# Patient Record
Sex: FEMALE | Race: BLACK OR AFRICAN AMERICAN | NOT HISPANIC OR LATINO | Employment: OTHER | URBAN - METROPOLITAN AREA
[De-identification: names, ages, dates, MRNs, and addresses within clinical notes are randomized per-mention and may not be internally consistent; named-entity substitution may affect disease eponyms.]

---

## 2019-10-09 ENCOUNTER — OFFICE VISIT (OUTPATIENT)
Dept: URGENT CARE | Facility: URGENT CARE | Age: 59
End: 2019-10-09
Payer: COMMERCIAL

## 2019-10-09 ENCOUNTER — ANCILLARY PROCEDURE (OUTPATIENT)
Dept: GENERAL RADIOLOGY | Facility: CLINIC | Age: 59
End: 2019-10-09
Attending: NURSE PRACTITIONER
Payer: COMMERCIAL

## 2019-10-09 VITALS
DIASTOLIC BLOOD PRESSURE: 72 MMHG | SYSTOLIC BLOOD PRESSURE: 126 MMHG | RESPIRATION RATE: 14 BRPM | HEART RATE: 88 BPM | OXYGEN SATURATION: 97 % | BODY MASS INDEX: 29.19 KG/M2 | WEIGHT: 171 LBS | HEIGHT: 64 IN | TEMPERATURE: 98.5 F

## 2019-10-09 DIAGNOSIS — M25.511 ACUTE PAIN OF BOTH SHOULDERS: Primary | ICD-10-CM

## 2019-10-09 DIAGNOSIS — M25.512 ACUTE PAIN OF BOTH SHOULDERS: ICD-10-CM

## 2019-10-09 DIAGNOSIS — M25.512 ACUTE PAIN OF BOTH SHOULDERS: Primary | ICD-10-CM

## 2019-10-09 DIAGNOSIS — M25.511 ACUTE PAIN OF BOTH SHOULDERS: ICD-10-CM

## 2019-10-09 PROBLEM — L30.9 CHRONIC ECZEMA: Status: ACTIVE | Noted: 2017-01-24

## 2019-10-09 PROBLEM — E05.90 HYPERTHYROIDISM: Status: ACTIVE | Noted: 2017-05-01

## 2019-10-09 PROBLEM — G47.19 EXCESSIVE DAYTIME SLEEPINESS: Status: ACTIVE | Noted: 2017-08-15

## 2019-10-09 PROBLEM — M19.90 OSTEOARTHROSIS: Status: ACTIVE | Noted: 2017-01-24

## 2019-10-09 PROBLEM — J45.40 MODERATE PERSISTENT ASTHMA WITHOUT COMPLICATION: Status: ACTIVE | Noted: 2017-01-24

## 2019-10-09 PROBLEM — G47.30 SLEEP APNEA: Status: ACTIVE | Noted: 2017-08-15

## 2019-10-09 PROCEDURE — 99203 OFFICE O/P NEW LOW 30 MIN: CPT | Performed by: NURSE PRACTITIONER

## 2019-10-09 PROCEDURE — 73030 X-RAY EXAM OF SHOULDER: CPT | Mod: RT

## 2019-10-09 RX ORDER — TOLTERODINE TARTRATE 2 MG/1
TABLET, EXTENDED RELEASE ORAL
COMMUNITY
Start: 2019-08-27

## 2019-10-09 RX ORDER — OXYBUTYNIN CHLORIDE 5 MG/1
1 TABLET ORAL AT BEDTIME
COMMUNITY
Start: 2019-09-11

## 2019-10-09 RX ORDER — CYCLOBENZAPRINE HCL 10 MG
10 TABLET ORAL
COMMUNITY
Start: 2019-08-27

## 2019-10-09 RX ORDER — HYDROCORTISONE 2.5 %
CREAM (GRAM) TOPICAL
COMMUNITY
Start: 2019-08-27

## 2019-10-09 RX ORDER — METHYLPREDNISOLONE 4 MG
TABLET, DOSE PACK ORAL
Qty: 21 TABLET | Refills: 0 | Status: SHIPPED | OUTPATIENT
Start: 2019-10-09

## 2019-10-09 RX ORDER — FLUTICASONE PROPIONATE 50 MCG
SPRAY, SUSPENSION (ML) NASAL
COMMUNITY
Start: 2019-08-27

## 2019-10-09 RX ORDER — CETIRIZINE HYDROCHLORIDE 10 MG/1
10 TABLET ORAL
COMMUNITY
Start: 2019-08-27

## 2019-10-09 RX ORDER — ALBUTEROL SULFATE 90 UG/1
2 AEROSOL, METERED RESPIRATORY (INHALATION)
COMMUNITY
Start: 2019-04-12

## 2019-10-09 RX ORDER — MELOXICAM 15 MG/1
15 TABLET ORAL
COMMUNITY
Start: 2019-08-27

## 2019-10-09 ASSESSMENT — ENCOUNTER SYMPTOMS
SHORTNESS OF BREATH: 0
NAUSEA: 0
ARTHRALGIAS: 1
MYALGIAS: 1
VOMITING: 0
WEAKNESS: 1
ABDOMINAL PAIN: 0
DIARRHEA: 0
NUMBNESS: 0
FEVER: 0

## 2019-10-09 ASSESSMENT — MIFFLIN-ST. JEOR: SCORE: 1325.65

## 2019-10-09 ASSESSMENT — PAIN SCALES - GENERAL: PAINLEVEL: SEVERE PAIN (7)

## 2019-10-09 NOTE — PATIENT INSTRUCTIONS
Medrol dosepak as prescribed  Continue with cyclobenzaprine 5-10 mg three times a day as needed for pain  Continue with meloxicam daily  Can do tylenol as needed for pain  Can alternate heat and ice to help with pain  Orthopedic referral.

## 2019-10-09 NOTE — PROGRESS NOTES
SUBJECTIVE:   Amaya Adams is a 59 year old female presenting with a chief complaint of   Chief Complaint   Patient presents with     Urgent Care     Shoulder Pain     Pain in left shoulder, painful to the touch x 2d No known injury -hx of rotator cuff injuries in both shoulders       She is a new patient of [facility].    MS Injury/Pain    Onset of symptoms was 2 day(s) ago.  Location: bilateral shoulder pain.   Context: Patient denies injury.   Course of symptoms is waxing and waning.    Severity moderate  Current and Associated symptoms: Pain, Tenderness, Decreased range of motion and Stiffness  Denies  Swelling, Bruising, Warmth and Redness  Aggravating Factors: reaching up to grab something.   Therapies to improve symptoms include: cyclobenzaprine, meloxicam.   This is not the first time this type of problem has occurred for this patient.  States history of bilateral rotator cuff tears.       Review of Systems   Constitutional: Negative for fever.   Respiratory: Negative for shortness of breath.    Cardiovascular: Negative for chest pain.   Gastrointestinal: Negative for abdominal pain, diarrhea, nausea and vomiting.   Musculoskeletal: Positive for arthralgias and myalgias.   Skin: Negative for rash.   Neurological: Positive for weakness. Negative for numbness.       History reviewed. No pertinent past medical history.  History reviewed. No pertinent family history.  Current Outpatient Medications   Medication Sig Dispense Refill     albuterol (PROAIR HFA/PROVENTIL HFA/VENTOLIN HFA) 108 (90 Base) MCG/ACT inhaler Inhale 2 puffs into the lungs       cetirizine (ZYRTEC) 10 MG tablet Take 10 mg by mouth       cyclobenzaprine (FLEXERIL) 10 MG tablet Take 10 mg by mouth       fluticasone (FLONASE) 50 MCG/ACT nasal spray INHALE TWO SPRAYS INTO EACH NOSTRIL EVERY DAY       fluticasone-vilanterol (BREO ELLIPTA) 100-25 MCG/INH inhaler Inhale 1 puff into the lungs       hydrocortisone 2.5 % cream        meloxicam (MOBIC) 15  "MG tablet Take 15 mg by mouth       methylPREDNISolone (MEDROL DOSEPAK) 4 MG tablet therapy pack Follow Package Directions 21 tablet 0     oxybutynin (DITROPAN) 5 MG tablet Take 1 tablet by mouth At Bedtime       tolterodine (DETROL) 2 MG tablet        Social History     Tobacco Use     Smoking status: Never Smoker     Smokeless tobacco: Never Used   Substance Use Topics     Alcohol use: Not on file       OBJECTIVE  /72 (BP Location: Right arm, Patient Position: Sitting, Cuff Size: Adult Regular)   Pulse 88   Temp 98.5  F (36.9  C) (Oral)   Resp 14   Ht 1.626 m (5' 4\")   Wt 77.6 kg (171 lb)   LMP  (LMP Unknown)   SpO2 97%   Breastfeeding? No   BMI 29.35 kg/m      Physical Exam  Vitals signs and nursing note reviewed.   Constitutional:       Appearance: Normal appearance. She is well-developed.   Cardiovascular:      Rate and Rhythm: Normal rate and regular rhythm.      Heart sounds: Normal heart sounds.   Pulmonary:      Effort: Pulmonary effort is normal.      Breath sounds: Normal breath sounds and air entry.   Musculoskeletal:      Right shoulder: She exhibits decreased range of motion, tenderness and pain. She exhibits no swelling, no effusion, no crepitus, no deformity and normal strength.      Left shoulder: She exhibits decreased range of motion, tenderness and pain. She exhibits no swelling, no effusion, no crepitus, normal pulse and normal strength.        Arms:    Skin:     General: Skin is warm and dry.      Capillary Refill: Capillary refill takes less than 2 seconds.   Neurological:      Mental Status: She is alert and oriented to person, place, and time.   Psychiatric:         Behavior: Behavior is cooperative.       X-Ray was done, my findings are: does potentially have some increased joint spacing, no acute fractures. Pending radiology final report.     ASSESSMENT:      ICD-10-CM    1. Acute pain of both shoulders M25.511 methylPREDNISolone (MEDROL DOSEPAK) 4 MG tablet therapy pack    " M25.512 XR Shoulder Bilateral G/E 2 Views     ORTHO  REFERRAL        Medical Decision Making:    Differential Diagnosis:  MS Injury Pain: sprain, fracture, tendonitis, muscle strain, contusion, dislocation, bursitis and arthritis     Serious Comorbid Conditions:  Adult:  Asthma and Hyperthyroidism    PLAN:  Discussed with patient my interpretation of the xray. Could consider potentially doing an arm sling for rest, however, given symptoms are bilaterally would not work well. Will do a medrol dosepak. Patient already has cyclobenzaprine and meloxicam prescribed. Additional symptomatic treatment recommendations discussed. Will have patient follow up with orthopedics for further evaluation and treatment. Did discussed potential physical therapy to help with symptoms, but will defer to orthopedics. Education was added to AVS. Patient was agreeable to plan and verbalized understanding.        Followup:    If not improving or if condition worsens, follow up with your Primary Care Provider    Patient Instructions   Medrol dosepak as prescribed  Continue with cyclobenzaprine 5-10 mg three times a day as needed for pain  Continue with meloxicam daily  Can do tylenol as needed for pain  Can alternate heat and ice to help with pain  Orthopedic referral.

## 2019-10-15 ENCOUNTER — OFFICE VISIT (OUTPATIENT)
Dept: ORTHOPEDICS | Facility: CLINIC | Age: 59
End: 2019-10-15
Payer: COMMERCIAL

## 2019-10-15 VITALS
SYSTOLIC BLOOD PRESSURE: 150 MMHG | HEIGHT: 64 IN | BODY MASS INDEX: 29.19 KG/M2 | WEIGHT: 171 LBS | DIASTOLIC BLOOD PRESSURE: 82 MMHG

## 2019-10-15 DIAGNOSIS — G89.29 CHRONIC PAIN OF BOTH SHOULDERS: Primary | ICD-10-CM

## 2019-10-15 DIAGNOSIS — M25.512 CHRONIC PAIN OF BOTH SHOULDERS: Primary | ICD-10-CM

## 2019-10-15 DIAGNOSIS — M25.511 CHRONIC PAIN OF BOTH SHOULDERS: Primary | ICD-10-CM

## 2019-10-15 DIAGNOSIS — M25.311 ROTATOR CUFF INSUFFICIENCY OF RIGHT SHOULDER: ICD-10-CM

## 2019-10-15 DIAGNOSIS — M25.312 ROTATOR CUFF INSUFFICIENCY OF LEFT SHOULDER: ICD-10-CM

## 2019-10-15 PROCEDURE — 99203 OFFICE O/P NEW LOW 30 MIN: CPT | Performed by: FAMILY MEDICINE

## 2019-10-15 ASSESSMENT — MIFFLIN-ST. JEOR: SCORE: 1325.65

## 2019-10-15 NOTE — PATIENT INSTRUCTIONS
1. Chronic pain of both shoulders    2. Rotator cuff insufficiency of left shoulder    3. Rotator cuff insufficiency of right shoulder      You have a chronic rotator cuff tear in both shoulders  Reviewed xrays - minimal arthritis  Physical therapy: [facility] - [phone]. Will need to keep up with your home exercise indefinitely.    Please discuss your elevated blood pressure with your primary care physician.    Follow-up for US guided bilateral shoulder injections

## 2019-10-15 NOTE — PROGRESS NOTES
ASSESSMENT & PLAN    1. Chronic pain of both shoulders    2. Rotator cuff insufficiency of left shoulder    3. Rotator cuff insufficiency of right shoulder      Seen in consultation for chronic bilateral shoulder pain  Exam and x-rays suggest chronic rotator cuff tear  Educated that given chronicity surgical repair is unlikely  Reviewed xrays - minimal arthritis  Physical therapy: [facility] - [phone]. Will need to keep up with home exercise indefinitely.  Please discuss your elevated blood pressure with your primary care physician.    Follow-up for US guided bilateral shoulder injections    -----    SUBJECTIVE  Amaya Adams is a/an 59 year old Right handed female who is seen in consultation at the request of  Dawson Duffy C.N.P. for evaluation of bilateral shoulder pain. The patient is seen by themselves.    Onset: 2-3 years(s) ago. Reports insidious onset without acute precipitating event. She reports that right shoulder pain has been constant, but the left shoulder became very painful about 1 week ago.   Location of Pain: bilateral anterior shoulder  Rating of Pain at worst: 10/10  Rating of Pain Currently: 7/10  Worsened by: lifting, sleeping on left or right side, reaching overhead, reaching behind back  Better with: hot shower  Treatments tried: rest/activity avoidance and other medications: Cyclobenzaprine (Flexeril), Medrol dosepak and meloxicam  Associated symptoms: feeling of instability in bilateral shoulders, no distal numbness or tingling; denies swelling or warmth  Orthopedic history: YES - patient reports h/o chronic bilateral shoulder pain on and off since late 1990s, previously was seeing a doctor in [place], [place], completed physical therapy and home exercises  Relevant surgical history: NO  Patient Social History: retired    Patient's past medical, surgical, social, and family histories were reviewed today and no pertinent history related to patient's presenting  "problem.    REVIEW OF SYSTEMS:  10 point ROS is negative other than symptoms noted above in HPI, Past Medical History or as stated below  Constitutional: NEGATIVE for fever, chills, change in weight  Skin: NEGATIVE for worrisome rashes, moles or lesions  GI/: NEGATIVE for bowel or bladder changes  Neuro: NEGATIVE for weakness, dizziness or paresthesias    OBJECTIVE:  BP (!) 150/82   Ht 1.626 m (5' 4\")   Wt 77.6 kg (171 lb)   LMP  (LMP Unknown)   BMI 29.35 kg/m     General: healthy, alert and in no distress  HEENT: no scleral icterus or conjunctival erythema  Skin: no suspicious lesions or rash. No jaundice.  CV: regular rhythm by palpation  Resp: normal respiratory effort without conversational dyspnea   Psych: normal mood and affect  Gait: normal steady gait with appropriate coordination and balance  Neuro: normal light touch sensory exam of the bilateral upper extremities.    MSK:  BILATERAL SHOULDER  Inspection:    mild cuff atrophy    Palpation:    Tender about the AC joint (left greater than right) and supraspinatus insertion. Remainder of bony and tendinous landmarks are nontender.  Active Range of Motion:     Abduction 1350, FF 1650, , IR painful.    Strength:    Scapular plane abduction 4+/5, painful,  ER 5-/5, IR 5/5  Special Tests:    Positive: Ferrell' and supraspinatus (empty can)    Independent visualization of the below image:    Results for orders placed or performed in visit on 10/09/19   XR Shoulder Bilateral G/E 2 Views    Narrative    BILATERAL SHOULDERS TWO OR MORE VIEWS  10/9/2019 6:12 PM     HISTORY: Acute pain of both shoulders.     FINDINGS: Note that if there is clinical concern for glenohumeral  dislocation, an axillary view would be considered the view of choice  in this regard.      Impression    IMPRESSION:   1. Left: There may be subacromial spurring. Resorptive change of the  humeral head. Otherwise unremarkable for technique  2. Right: There appears to be subacromial " spurring. Some resorptive  change of the humeral head. Otherwise unremarkable for technique.    MD Alvarez NASCIMENTO, DO [facility]

## 2019-10-15 NOTE — LETTER
10/15/2019         RE: Amaya Adams  [address]  [place] [facility] [address]        Dear Colleague,    Thank you for referring your patient, Amaya Adams, to the [facility]. Please see a copy of my visit note below.    ASSESSMENT & PLAN    1. Chronic pain of both shoulders    2. Rotator cuff insufficiency of left shoulder    3. Rotator cuff insufficiency of right shoulder      Seen in consultation for chronic bilateral shoulder pain  Exam and x-rays suggest chronic rotator cuff tear  Educated that given chronicity surgical repair is unlikely  Reviewed xrays - minimal arthritis  Physical therapy: [facility] - [phone]. Will need to keep up with home exercise indefinitely.  Please discuss your elevated blood pressure with your primary care physician.    Follow-up for US guided bilateral shoulder injections    -----    SUBJECTIVE  Amaya Adams is a/an 59 year old Right handed female who is seen in consultation at the request of  Dawson Duffy C.N.P. for evaluation of bilateral shoulder pain. The patient is seen by themselves.    Onset: 2-3 years(s) ago. Reports insidious onset without acute precipitating event. She reports that right shoulder pain has been constant, but the left shoulder became very painful about 1 week ago.   Location of Pain: bilateral anterior shoulder  Rating of Pain at worst: 10/10  Rating of Pain Currently: 7/10  Worsened by: lifting, sleeping on left or right side, reaching overhead, reaching behind back  Better with: hot shower  Treatments tried: rest/activity avoidance and other medications: Cyclobenzaprine (Flexeril), Medrol dosepak and meloxicam  Associated symptoms: feeling of instability in bilateral shoulders, no distal numbness or tingling; denies swelling or warmth  Orthopedic history: YES - patient reports h/o chronic bilateral shoulder pain on and off since late 1990s, previously was seeing a doctor in [place], [place], completed  "physical therapy and home exercises  Relevant surgical history: NO  Patient Social History: retired    Patient's past medical, surgical, social, and family histories were reviewed today and no pertinent history related to patient's presenting problem.    REVIEW OF SYSTEMS:  10 point ROS is negative other than symptoms noted above in HPI, Past Medical History or as stated below  Constitutional: NEGATIVE for fever, chills, change in weight  Skin: NEGATIVE for worrisome rashes, moles or lesions  GI/: NEGATIVE for bowel or bladder changes  Neuro: NEGATIVE for weakness, dizziness or paresthesias    OBJECTIVE:  BP (!) 150/82   Ht 1.626 m (5' 4\")   Wt 77.6 kg (171 lb)   LMP  (LMP Unknown)   BMI 29.35 kg/m      General: healthy, alert and in no distress  HEENT: no scleral icterus or conjunctival erythema  Skin: no suspicious lesions or rash. No jaundice.  CV: regular rhythm by palpation  Resp: normal respiratory effort without conversational dyspnea   Psych: normal mood and affect  Gait: normal steady gait with appropriate coordination and balance  Neuro: normal light touch sensory exam of the bilateral upper extremities.    MSK:  BILATERAL SHOULDER  Inspection:    mild cuff atrophy    Palpation:    Tender about the AC joint (left greater than right) and supraspinatus insertion. Remainder of bony and tendinous landmarks are nontender.  Active Range of Motion:     Abduction 1350, FF 1650, , IR painful.    Strength:    Scapular plane abduction 4+/5, painful,  ER 5-/5, IR 5/5  Special Tests:    Positive: Ferrell' and supraspinatus (empty can)    Independent visualization of the below image:    Results for orders placed or performed in visit on 10/09/19   XR Shoulder Bilateral G/E 2 Views    Narrative    BILATERAL SHOULDERS TWO OR MORE VIEWS  10/9/2019 6:12 PM     HISTORY: Acute pain of both shoulders.     FINDINGS: Note that if there is clinical concern for glenohumeral  dislocation, an axillary view would be " considered the view of choice  in this regard.      Impression    IMPRESSION:   1. Left: There may be subacromial spurring. Resorptive change of the  humeral head. Otherwise unremarkable for technique  2. Right: There appears to be subacromial spurring. Some resorptive  change of the humeral head. Otherwise unremarkable for technique.    MD Alvarez NASCIMENTO, DO [facility]      Again, thank you for allowing me to participate in the care of your patient.        Sincerely,        Alvarez Pascal, DO

## 2019-10-22 ENCOUNTER — OFFICE VISIT (OUTPATIENT)
Dept: ORTHOPEDICS | Facility: CLINIC | Age: 59
End: 2019-10-22
Payer: COMMERCIAL

## 2019-10-22 DIAGNOSIS — M25.312 ROTATOR CUFF INSUFFICIENCY OF LEFT SHOULDER: ICD-10-CM

## 2019-10-22 DIAGNOSIS — G89.29 CHRONIC PAIN OF BOTH SHOULDERS: Primary | ICD-10-CM

## 2019-10-22 DIAGNOSIS — M25.511 CHRONIC PAIN OF BOTH SHOULDERS: Primary | ICD-10-CM

## 2019-10-22 DIAGNOSIS — M25.512 CHRONIC PAIN OF BOTH SHOULDERS: Primary | ICD-10-CM

## 2019-10-22 DIAGNOSIS — M25.311 ROTATOR CUFF INSUFFICIENCY OF RIGHT SHOULDER: ICD-10-CM

## 2019-10-22 PROCEDURE — 20611 DRAIN/INJ JOINT/BURSA W/US: CPT | Mod: 50 | Performed by: FAMILY MEDICINE

## 2019-10-22 RX ORDER — METHYLPREDNISOLONE ACETATE 40 MG/ML
40 INJECTION, SUSPENSION INTRA-ARTICULAR; INTRALESIONAL; INTRAMUSCULAR; SOFT TISSUE
Status: SHIPPED | OUTPATIENT
Start: 2019-10-22

## 2019-10-22 RX ADMIN — METHYLPREDNISOLONE ACETATE 40 MG: 40 INJECTION, SUSPENSION INTRA-ARTICULAR; INTRALESIONAL; INTRAMUSCULAR; SOFT TISSUE at 15:45

## 2019-10-22 NOTE — LETTER
10/22/2019         RE: Amaya Adams  [address]  [place] [facility] [address]        Dear Colleague,    Thank you for referring your patient, Amaya Adams, to the [facility]. Please see a copy of my visit note below.    ASSESSMENT & PLAN    1. Chronic pain of both shoulders    2. Rotator cuff insufficiency of left shoulder    3. Rotator cuff insufficiency of right shoulder      Steroid injection of the bilateral shoulder: intra-articular  was performed today in clinic  Can repeat the injection anytime after 4 months    Follow up for repeat injection when pain returns.     -----      SUBJECTIVE:  Amaya Adams is a 59 year old female who is seen in follow-up for US guided bilateral glenohumeral joint injections. They were last seen 10/15/2019.    They indicate that their current pain level is 5/10.    The patient is seen with their daughter.    Independent visualization of the below image:  Results for orders placed or performed in visit on 10/09/19   XR Shoulder Bilateral G/E 2 Views    Narrative    BILATERAL SHOULDERS TWO OR MORE VIEWS  10/9/2019 6:12 PM     HISTORY: Acute pain of both shoulders.     FINDINGS: Note that if there is clinical concern for glenohumeral  dislocation, an axillary view would be considered the view of choice  in this regard.      Impression    IMPRESSION:   1. Left: There may be subacromial spurring. Resorptive change of the  humeral head. Otherwise unremarkable for technique  2. Right: There appears to be subacromial spurring. Some resorptive  change of the humeral head. Otherwise unremarkable for technique.    BRITT MARTIN MD         Large Joint Injection/Arthocentesis: bilateral glenohumeral  Date/Time: 10/22/2019 3:45 PM  Performed by: Alvarez Pascal DO  Authorized by: Alvarez Pascal DO     Indications:  Pain and osteoarthritis  Needle Size:  22 G  Guidance: ultrasound    Approach:  Posterior  Location:  Shoulder  Laterality:  Bilateral      Site:   Bilateral glenohumeral  Medications (Right):  40 mg methylPREDNISolone 40 MG/ML  Medications (Left):  40 mg methylPREDNISolone 40 MG/ML  Outcome:  Tolerated well, no immediate complications  Procedure discussed: discussed risks, benefits, and alternatives    Consent Given by:  Patient  Timeout: timeout called immediately prior to procedure    Prep: patient was prepped and draped in usual sterile fashion       Pain noted to be a 5/10 before completion of the procedure and 5/10 after completion of the procedure.        Alvarez Pascal DO [facility]        Again, thank you for allowing me to participate in the care of your patient.        Sincerely,        Alvarez Pascal DO

## 2019-10-22 NOTE — PATIENT INSTRUCTIONS
1. Chronic pain of both shoulders    2. Rotator cuff insufficiency of left shoulder    3. Rotator cuff insufficiency of right shoulder      Steroid injection of the bilateral shoulder: intra-articular  was performed today in clinic    - Would not soak in a hot tub, bath or swimming pool for 48 hours  - Ok to shower  - Ice today and only do your normal amounts of activity  - The lidocaine (what is giving you pain relief right now) will likely stop working in 1-2 hours.  You will then have pain again, similar to before you received the injection. The corticosteroid will not start working until approximately 1-2 weeks from now.  In a small percentage of people, cortisone can cause flushing/redness in the face. This usually lasts for 1-3 days and resolves. Cool compress and Ibuprofen/Tylenol can help if this happens.  Can repeat the injection anytime after 4 months    Follow up for repeat injection when pain returns.

## 2019-10-22 NOTE — PROGRESS NOTES
ASSESSMENT & PLAN    1. Chronic pain of both shoulders    2. Rotator cuff insufficiency of left shoulder    3. Rotator cuff insufficiency of right shoulder      Steroid injection of the bilateral shoulder: intra-articular  was performed today in clinic  Can repeat the injection anytime after 4 months    Follow up for repeat injection when pain returns.     -----      SUBJECTIVE:  Amaya Adams is a 59 year old female who is seen in follow-up for US guided bilateral glenohumeral joint injections. They were last seen 10/15/2019.    They indicate that their current pain level is 5/10.    The patient is seen with their daughter.    Independent visualization of the below image:  Results for orders placed or performed in visit on 10/09/19   XR Shoulder Bilateral G/E 2 Views    Narrative    BILATERAL SHOULDERS TWO OR MORE VIEWS  10/9/2019 6:12 PM     HISTORY: Acute pain of both shoulders.     FINDINGS: Note that if there is clinical concern for glenohumeral  dislocation, an axillary view would be considered the view of choice  in this regard.      Impression    IMPRESSION:   1. Left: There may be subacromial spurring. Resorptive change of the  humeral head. Otherwise unremarkable for technique  2. Right: There appears to be subacromial spurring. Some resorptive  change of the humeral head. Otherwise unremarkable for technique.    BRITT MARTIN MD         Large Joint Injection/Arthocentesis: bilateral glenohumeral  Date/Time: 10/22/2019 3:45 PM  Performed by: Alvarez Pascal DO  Authorized by: Alvarez Pascal DO     Indications:  Pain and osteoarthritis  Needle Size:  22 G  Guidance: ultrasound    Approach:  Posterior  Location:  Shoulder  Laterality:  Bilateral      Site:  Bilateral glenohumeral  Medications (Right):  40 mg methylPREDNISolone 40 MG/ML  Medications (Left):  40 mg methylPREDNISolone 40 MG/ML  Outcome:  Tolerated well, no immediate complications  Procedure discussed: discussed risks, benefits, and  alternatives    Consent Given by:  Patient  Timeout: timeout called immediately prior to procedure    Prep: patient was prepped and draped in usual sterile fashion       Pain noted to be a 5/10 before completion of the procedure and 5/10 after completion of the procedure.        Alvarez Pascal DO [facility]

## 2021-05-22 ENCOUNTER — APPOINTMENT (OUTPATIENT)
Dept: CT IMAGING | Facility: CLINIC | Age: 61
End: 2021-05-22
Attending: EMERGENCY MEDICINE
Payer: COMMERCIAL

## 2021-05-22 ENCOUNTER — HOSPITAL ENCOUNTER (EMERGENCY)
Facility: CLINIC | Age: 61
Discharge: HOME | End: 2021-05-22
Attending: EMERGENCY MEDICINE | Admitting: EMERGENCY MEDICINE
Payer: COMMERCIAL

## 2021-05-22 VITALS
SYSTOLIC BLOOD PRESSURE: 134 MMHG | OXYGEN SATURATION: 100 % | DIASTOLIC BLOOD PRESSURE: 81 MMHG | TEMPERATURE: 98.1 F | RESPIRATION RATE: 20 BRPM | HEART RATE: 91 BPM

## 2021-05-22 DIAGNOSIS — R31.29 MICROSCOPIC HEMATURIA: ICD-10-CM

## 2021-05-22 DIAGNOSIS — N20.0 NEPHROLITHIASIS: ICD-10-CM

## 2021-05-22 DIAGNOSIS — N20.1 URETEROLITHIASIS: ICD-10-CM

## 2021-05-22 LAB
ALBUMIN SERPL-MCNC: 3.9 G/DL (ref 3.4–5)
ALBUMIN UR-MCNC: NEGATIVE MG/DL
ALP SERPL-CCNC: 131 U/L (ref 40–150)
ALT SERPL W P-5'-P-CCNC: 18 U/L (ref 0–50)
AMORPH CRY #/AREA URNS HPF: ABNORMAL /HPF
ANION GAP SERPL CALCULATED.3IONS-SCNC: 5 MMOL/L (ref 3–14)
APPEARANCE UR: CLEAR
AST SERPL W P-5'-P-CCNC: 9 U/L (ref 0–45)
BASOPHILS # BLD AUTO: 0 10E9/L (ref 0–0.2)
BASOPHILS NFR BLD AUTO: 0.3 %
BILIRUB SERPL-MCNC: 0.6 MG/DL (ref 0.2–1.3)
BILIRUB UR QL STRIP: NEGATIVE
BUN SERPL-MCNC: 17 MG/DL (ref 7–30)
CALCIUM SERPL-MCNC: 9.4 MG/DL (ref 8.5–10.1)
CHLORIDE SERPL-SCNC: 100 MMOL/L (ref 94–109)
CO2 SERPL-SCNC: 30 MMOL/L (ref 20–32)
COLOR UR AUTO: ABNORMAL
CREAT SERPL-MCNC: 0.95 MG/DL (ref 0.52–1.04)
DIFFERENTIAL METHOD BLD: ABNORMAL
EOSINOPHIL # BLD AUTO: 0.1 10E9/L (ref 0–0.7)
EOSINOPHIL NFR BLD AUTO: 0.6 %
ERYTHROCYTE [DISTWIDTH] IN BLOOD BY AUTOMATED COUNT: 15.2 % (ref 10–15)
GFR SERPL CREATININE-BSD FRML MDRD: 63 ML/MIN/{1.73_M2}
GLUCOSE SERPL-MCNC: 112 MG/DL (ref 70–99)
GLUCOSE UR STRIP-MCNC: NEGATIVE MG/DL
HCT VFR BLD AUTO: 40.8 % (ref 35–47)
HGB BLD-MCNC: 13 G/DL (ref 11.7–15.7)
HGB UR QL STRIP: ABNORMAL
IMM GRANULOCYTES # BLD: 0.1 10E9/L (ref 0–0.4)
IMM GRANULOCYTES NFR BLD: 0.5 %
KETONES UR STRIP-MCNC: NEGATIVE MG/DL
LACTATE BLD-SCNC: 1.2 MMOL/L (ref 0.7–2)
LEUKOCYTE ESTERASE UR QL STRIP: NEGATIVE
LIPASE SERPL-CCNC: 59 U/L (ref 73–393)
LYMPHOCYTES # BLD AUTO: 1.2 10E9/L (ref 0.8–5.3)
LYMPHOCYTES NFR BLD AUTO: 10.2 %
MCH RBC QN AUTO: 25.8 PG (ref 26.5–33)
MCHC RBC AUTO-ENTMCNC: 31.9 G/DL (ref 31.5–36.5)
MCV RBC AUTO: 81 FL (ref 78–100)
MONOCYTES # BLD AUTO: 0.9 10E9/L (ref 0–1.3)
MONOCYTES NFR BLD AUTO: 7.4 %
NEUTROPHILS # BLD AUTO: 9.4 10E9/L (ref 1.6–8.3)
NEUTROPHILS NFR BLD AUTO: 81 %
NITRATE UR QL: NEGATIVE
NRBC # BLD AUTO: 0 10*3/UL
NRBC BLD AUTO-RTO: 0 /100
PH UR STRIP: 7.5 PH (ref 5–7)
PLATELET # BLD AUTO: 400 10E9/L (ref 150–450)
POTASSIUM SERPL-SCNC: 3.6 MMOL/L (ref 3.4–5.3)
PROT SERPL-MCNC: 8.8 G/DL (ref 6.8–8.8)
RBC # BLD AUTO: 5.04 10E12/L (ref 3.8–5.2)
RBC #/AREA URNS AUTO: >182 /HPF (ref 0–2)
SODIUM SERPL-SCNC: 135 MMOL/L (ref 133–144)
SOURCE: ABNORMAL
SP GR UR STRIP: 1.02 (ref 1–1.03)
UROBILINOGEN UR STRIP-MCNC: NORMAL MG/DL (ref 0–2)
WBC # BLD AUTO: 11.6 10E9/L (ref 4–11)
WBC #/AREA URNS AUTO: 5 /HPF (ref 0–5)

## 2021-05-22 PROCEDURE — 96361 HYDRATE IV INFUSION ADD-ON: CPT

## 2021-05-22 PROCEDURE — 80053 COMPREHEN METABOLIC PANEL: CPT | Performed by: EMERGENCY MEDICINE

## 2021-05-22 PROCEDURE — 96374 THER/PROPH/DIAG INJ IV PUSH: CPT | Mod: 59

## 2021-05-22 PROCEDURE — 85025 COMPLETE CBC W/AUTO DIFF WBC: CPT | Performed by: EMERGENCY MEDICINE

## 2021-05-22 PROCEDURE — 83605 ASSAY OF LACTIC ACID: CPT | Performed by: EMERGENCY MEDICINE

## 2021-05-22 PROCEDURE — 258N000003 HC RX IP 258 OP 636: Performed by: EMERGENCY MEDICINE

## 2021-05-22 PROCEDURE — 81001 URINALYSIS AUTO W/SCOPE: CPT | Performed by: EMERGENCY MEDICINE

## 2021-05-22 PROCEDURE — 96375 TX/PRO/DX INJ NEW DRUG ADDON: CPT

## 2021-05-22 PROCEDURE — 250N000011 HC RX IP 250 OP 636: Performed by: EMERGENCY MEDICINE

## 2021-05-22 PROCEDURE — 99285 EMERGENCY DEPT VISIT HI MDM: CPT | Mod: 25

## 2021-05-22 PROCEDURE — 74177 CT ABD & PELVIS W/CONTRAST: CPT

## 2021-05-22 PROCEDURE — 83690 ASSAY OF LIPASE: CPT | Performed by: EMERGENCY MEDICINE

## 2021-05-22 RX ORDER — ONDANSETRON 4 MG/1
4 TABLET, ORALLY DISINTEGRATING ORAL EVERY 8 HOURS PRN
Qty: 10 TABLET | Refills: 0 | Status: SHIPPED | OUTPATIENT
Start: 2021-05-22

## 2021-05-22 RX ORDER — IOPAMIDOL 755 MG/ML
86 INJECTION, SOLUTION INTRAVASCULAR ONCE
Status: COMPLETED | OUTPATIENT
Start: 2021-05-22 | End: 2021-05-22

## 2021-05-22 RX ORDER — MORPHINE SULFATE 4 MG/ML
4 INJECTION, SOLUTION INTRAMUSCULAR; INTRAVENOUS ONCE
Status: COMPLETED | OUTPATIENT
Start: 2021-05-22 | End: 2021-05-22

## 2021-05-22 RX ORDER — KETOROLAC TROMETHAMINE 15 MG/ML
15 INJECTION, SOLUTION INTRAMUSCULAR; INTRAVENOUS ONCE
Status: COMPLETED | OUTPATIENT
Start: 2021-05-22 | End: 2021-05-22

## 2021-05-22 RX ORDER — ONDANSETRON 2 MG/ML
4 INJECTION INTRAMUSCULAR; INTRAVENOUS ONCE
Status: COMPLETED | OUTPATIENT
Start: 2021-05-22 | End: 2021-05-22

## 2021-05-22 RX ORDER — HYDROCODONE BITARTRATE AND ACETAMINOPHEN 5; 325 MG/1; MG/1
1 TABLET ORAL EVERY 6 HOURS PRN
Qty: 15 TABLET | Refills: 0 | Status: SHIPPED | OUTPATIENT
Start: 2021-05-22

## 2021-05-22 RX ORDER — IBUPROFEN 600 MG/1
600 TABLET, FILM COATED ORAL EVERY 6 HOURS PRN
Qty: 40 TABLET | Refills: 0 | Status: SHIPPED | OUTPATIENT
Start: 2021-05-22 | End: 2021-06-01

## 2021-05-22 RX ORDER — TAMSULOSIN HYDROCHLORIDE 0.4 MG/1
0.4 CAPSULE ORAL DAILY
Qty: 7 CAPSULE | Refills: 0 | Status: SHIPPED | OUTPATIENT
Start: 2021-05-22 | End: 2021-05-29

## 2021-05-22 RX ADMIN — SODIUM CHLORIDE 1000 ML: 9 INJECTION, SOLUTION INTRAVENOUS at 06:05

## 2021-05-22 RX ADMIN — IOPAMIDOL 86 ML: 755 INJECTION, SOLUTION INTRAVENOUS at 06:38

## 2021-05-22 RX ADMIN — KETOROLAC TROMETHAMINE 15 MG: 15 INJECTION, SOLUTION INTRAMUSCULAR; INTRAVENOUS at 06:06

## 2021-05-22 RX ADMIN — MORPHINE SULFATE 4 MG: 4 INJECTION INTRAVENOUS at 07:17

## 2021-05-22 RX ADMIN — ONDANSETRON 4 MG: 2 INJECTION INTRAMUSCULAR; INTRAVENOUS at 06:05

## 2021-05-22 ASSESSMENT — ENCOUNTER SYMPTOMS
VOMITING: 1
CONSTIPATION: 0
FEVER: 0
HEMATURIA: 0
FLANK PAIN: 1
DIFFICULTY URINATING: 1
NAUSEA: 1
ABDOMINAL PAIN: 1
DYSURIA: 0

## 2021-05-22 NOTE — ED PROVIDER NOTES
History   Chief Complaint:  Flank Pain     The history is provided by the patient.      Amaya Adams is a 61 year old female who presents with right flank pain. She developed cramping right flank pain radiating to her right abdomen suddenly this morning. She initially thought this related to her history of hernias but when her pain persisted - currently 10/10 - this prompted her visit. She has had a stuttering urine stream without hematuria or dysuria. She has had no fever. She had a small bowel movement this morning. She had nausea and vomiting today.    Notably, she has a history of kidney stones, but is not sure if the pain today is similar to that diagnosis or not.    Review of Systems   Constitutional: Negative for fever.   Gastrointestinal: Positive for abdominal pain, nausea and vomiting. Negative for constipation.   Genitourinary: Positive for difficulty urinating and flank pain. Negative for dysuria and hematuria.   All other systems reviewed and are negative.    Allergies:  Lactase    Medications:  Albuterol inhaler  Breo Ellipta  Meloxicam    Past Medical History:    MICHAEL  Hyperthyroidism  Osteoarthritis  Asthma  Chronic eczema   Kidney stone     Past Surgical History:    Abdominal hernia repair  Hysterectomy  Fibroidectomy  Breast biopsy  Denies history of cholecystectomy or appendectomy.     Family History:    Diabetes  Brain aneurysm  Goiter  Hypertension    Social History:  The patient presents to the ER with her daughter.   No history of tobacco use.     Physical Exam     Patient Vitals for the past 24 hrs:   BP Temp Temp src Pulse Resp SpO2   05/22/21 0815 134/81 -- -- -- -- 100 %   05/22/21 0328 (!) 179/91 98.1  F (36.7  C) Oral 91 20 99 %       Physical Exam  General: Well-developed and well-nourished. Uncomfortable appearing middle aged  woman. Cooperative.  Head:  Atraumatic.  Eyes:  Conjunctivae, lids, and sclerae are normal.  Neck:  Supple. Normal range of  motion.  CV:  Regular rate and rhythm. Normal heart sounds with no murmurs, rubs, or gallops detected.  Resp:  No respiratory distress. Clear to auscultation bilaterally without decreased breath sounds, wheezing, rales, or rhonchi.  GI:  Soft. Non-distended.  Inferior right flank tenderness without CVA tenderness.  No significant reproducible abdominal tenderness.    MS:  Normal ROM.   Skin:  Warm. Non-diaphoretic. No pallor.  Neuro:  Awake. A&Ox3. Normal strength.  Psych: Normal mood and affect. Normal speech.  Vitals reviewed.    Emergency Department Course     Imaging:  CT Abdomen Pelvis w Contrast  IMPRESSION:   1. 0.8 cm mid to distal right ureteral calculus, resulting in moderate obstruction.  2. Tiny nonobstructing right renal calculus.   Reading per radiology     Laboratory:  CBC: WBC 11.6(H), HGB 13.0,    CMP: glucose 112(H) o/w WNL (Creatinine 0.95)    Lipase: 59(L)  Lactic acid (result time 0621) 1.2    UA with microscopic: blood large, pH 7.5, RBC >182(H), amorphous crystals few o/w WNL        Emergency Department Course:  Reviewed:  I reviewed the patient's nursing notes, vitals, past medical records, and Care Everywhere.     Assessments:  0616 I performed an exam of the patient and obtained history, as documented above.   0714 Her pain is now down to a 4/10 after urination. She has not received morphine.   0806 I returned to recheck and update the patient. Her pain is resolved after morphine and she is comfortable trialing outpatient management.     Interventions:  0605 NS 1000 mL IV  0605 Zofran 4 mg IV  0606 Toradol 15 mg IV  0717 Morphine 4 mg IV    Disposition:  The patient was discharged home.     Impression & Plan   Medical Decision Making:  Amaya is a 61-year-old woman who presents with right-sided flank pain radiating towards her right abdomen starting suddenly this morning.  She has had nausea and vomiting, and noted that it was more difficult to urinate today than usual.  However, she  has no dysuria.  On exam, she appears uncomfortable.  She has no CVA tenderness but some tenderness on the inferior right flank.  There is no abdominal tenderness.  She has mild leukocytosis to 11.6 which is likely stress demarginalization related to ureterolithiasis as evidenced by CT scan of the abdomen and pelvis with 8 mm distal right stone and tiny right nephrolithiasis incidentally noted.  Fortunately, there is no lactic acidosis, pancreatitis, hepatitis, biliary obstruction, kidney injury, or significant electrolyte derangements.  Urinalysis reveals microscopic hematuria without evidence of infection.  She was given Zofran, Toradol, Morphine, and IV fluids and on repeat assessment has had resolution of her pain.  Although her stone is on the larger size at 8mm, she warrants a trial of passage because her pain is so well controlled and there is no evidence of infection.  I had a long discussion with her and her daughter about plan going forward including the use of Flomax and straining her urine.  I encouraged her to stay on top of her pain with Motrin every 6 hours and Norco for more severe pain.  I also provided her Zofran as needed for nausea and vomiting.  I encouraged her to follow-up closely with urology given the size of her stone and she agrees to call and arrange an appointment first thing Monday.  We discussed return precautions in detail and all of her and her daughter's questions were answered.  They verbalized understanding and are amenable to discharge.    Diagnosis:    ICD-10-CM    1. Ureterolithiasis  N20.1    2. Microscopic hematuria  R31.29    3. Nephrolithiasis  N20.0        Discharge Medications:  New Prescriptions    HYDROCODONE-ACETAMINOPHEN (NORCO) 5-325 MG TABLET    Take 1 tablet by mouth every 6 hours as needed for pain    IBUPROFEN (ADVIL/MOTRIN) 600 MG TABLET    Take 1 tablet (600 mg) by mouth every 6 hours as needed for moderate pain    ONDANSETRON (ZOFRAN ODT) 4 MG ODT TAB    Take  1 tablet (4 mg) by mouth every 8 hours as needed for nausea    TAMSULOSIN (FLOMAX) 0.4 MG CAPSULE    Take 1 capsule (0.4 mg) by mouth daily for 7 days         Corie MOSS, rosetta serving as a scribe at 7:17 AM on 5/22/2021 to document services personally performed by Dominga Herrera MD based on my observations and the provider's statements to me.       Dominga Herrera MD  05/22/21 1827

## 2021-08-15 ENCOUNTER — HEALTH MAINTENANCE LETTER (OUTPATIENT)
Age: 61
End: 2021-08-15

## 2021-10-10 ENCOUNTER — HEALTH MAINTENANCE LETTER (OUTPATIENT)
Age: 61
End: 2021-10-10

## 2021-11-02 ENCOUNTER — OFFICE VISIT (OUTPATIENT)
Dept: URGENT CARE | Facility: URGENT CARE | Age: 61
End: 2021-11-02
Payer: COMMERCIAL

## 2021-11-02 VITALS
HEART RATE: 83 BPM | WEIGHT: 172.6 LBS | SYSTOLIC BLOOD PRESSURE: 148 MMHG | OXYGEN SATURATION: 100 % | DIASTOLIC BLOOD PRESSURE: 90 MMHG | BODY MASS INDEX: 29.63 KG/M2 | RESPIRATION RATE: 18 BRPM | TEMPERATURE: 98.5 F

## 2021-11-02 DIAGNOSIS — K04.7 DENTAL ABSCESS: Primary | ICD-10-CM

## 2021-11-02 DIAGNOSIS — M77.11 LATERAL EPICONDYLITIS OF RIGHT ELBOW: ICD-10-CM

## 2021-11-02 PROCEDURE — 99214 OFFICE O/P EST MOD 30 MIN: CPT | Performed by: FAMILY MEDICINE

## 2021-11-02 RX ORDER — DICLOFENAC SODIUM 75 MG/1
75 TABLET, DELAYED RELEASE ORAL 2 TIMES DAILY
Qty: 20 TABLET | Refills: 0 | Status: SHIPPED | OUTPATIENT
Start: 2021-11-02 | End: 2021-11-12

## 2021-11-02 NOTE — PROGRESS NOTES
ASSESSMENT:    ICD-10-CM    1. Dental abscess  K04.7 amoxicillin-clavulanate (AUGMENTIN) 875-125 MG tablet     diclofenac (VOLTAREN) 75 MG EC tablet   2. Lateral epicondylitis of right elbow  M77.11      No evidence of spreading infection at this time.    R lateral epicondylitis likely related to overuse during crafting.  Encouraged patient to take a few days off of active crafting.    PLAN:  Patient Instructions   Augmentin twice a day for the next 10 days to fight the infection around your tooth.    Diclofenac 75 mg twice a day as needed for pain control.  I recommend taking this twice a day for 5 days whether or not you feel like you need it and then more as needed after 5 days.    The diclofenac should also help with that soreness in your elbow.  Try to take a couple of days off from crafting to rest this area. :)    If you choose to do so, I recommend using a probiotic supplement once daily while on antibiotics and for a few weeks afterward.  It doesn't look like your insurance will cover any of these over-the-counter options, but you can ask at the pharmacy for a brand recommendation.  Culturelle is usually an easy one to find, and sometimes there are store brands available too.    SUBJECTIVE:  Amaya Adams is a 61 year old female who presents to  today with concern for dental abscess.  She has been trying to find an appointment with a dentist who accepts her insurance, and she hasn't yet been successful.  She has noticed increased swelling in her upper gums on the R as well as some soreness around her lower right 3rd molar, which she knows needs extraction.  She hasn't had a fever.  Hasn't had trouble swallowing.      Also c/o R elbow pain.  She is wondering if she is developing arthritis that is causing this.  She is a crafter and loves to stay active.  She uses her hands a lot.    OBJECTIVE:  BP (!) 148/90 (BP Location: Right arm, Patient Position: Chair, Cuff Size: Adult Large)   Pulse 83   Temp 98.5   F (36.9  C) (Oral)   Resp 18   Wt 78.3 kg (172 lb 9.6 oz)   LMP  (LMP Unknown)   SpO2 100%   Breastfeeding No   BMI 29.63 kg/m    GEN: well-appearing, in NAD  ENT: abscess noted in gingival tissue between tooth #5 and #6, mild erythema and swelling in gingiva around tooth #32.  Oral MMM, normal pharynx  Neck: no LAD or fluctuance  Ext:  Tender R lateral epicondyle.  No joint swelling or erythema or warmth.  No olecranon bursa enlargement.  No tenderness in the R foerarm.

## 2021-11-02 NOTE — PATIENT INSTRUCTIONS
Augmentin twice a day for the next 10 days to fight the infection around your tooth.    Diclofenac 75 mg twice a day as needed for pain control.  I recommend taking this twice a day for 5 days whether or not you feel like you need it and then more as needed after 5 days.    The diclofenac should also help with that soreness in your elbow.  Try to take a couple of days off from crafting to rest this area. :)    If you choose to do so, I recommend using a probiotic supplement once daily while on antibiotics and for a few weeks afterward.  It doesn't look like your insurance will cover any of these over-the-counter options, but you can ask at the pharmacy for a brand recommendation.  Culturelle is usually an easy one to find, and sometimes there are store brands available too.

## 2022-05-10 ENCOUNTER — OFFICE VISIT (OUTPATIENT)
Dept: URGENT CARE | Facility: URGENT CARE | Age: 62
End: 2022-05-10
Payer: COMMERCIAL

## 2022-05-10 VITALS
HEART RATE: 86 BPM | DIASTOLIC BLOOD PRESSURE: 70 MMHG | RESPIRATION RATE: 18 BRPM | TEMPERATURE: 98.4 F | OXYGEN SATURATION: 99 % | SYSTOLIC BLOOD PRESSURE: 120 MMHG | BODY MASS INDEX: 29.09 KG/M2 | WEIGHT: 169.5 LBS

## 2022-05-10 DIAGNOSIS — R07.89 CHEST WALL PAIN: ICD-10-CM

## 2022-05-10 DIAGNOSIS — J45.41 MODERATE PERSISTENT ASTHMA WITH ACUTE EXACERBATION: Primary | ICD-10-CM

## 2022-05-10 PROCEDURE — 99214 OFFICE O/P EST MOD 30 MIN: CPT | Performed by: PHYSICIAN ASSISTANT

## 2022-05-10 RX ORDER — PREDNISONE 20 MG/1
40 TABLET ORAL DAILY
Qty: 10 TABLET | Refills: 0 | Status: SHIPPED | OUTPATIENT
Start: 2022-05-10 | End: 2022-05-15

## 2022-05-10 ASSESSMENT — ENCOUNTER SYMPTOMS
FEVER: 0
WHEEZING: 1
COUGH: 1

## 2022-05-10 NOTE — PROGRESS NOTES
Assessment & Plan:        ICD-10-CM    1. Moderate persistent asthma with acute exacerbation  J45.41 predniSONE (DELTASONE) 20 MG tablet   2. Chest wall pain  R07.89          Plan/Clinical Decision Making:    Patient having tenderness with palpation and movement of mid upper chest. Patient having chest wall pain x 1 day with recent increased coughing/wheezing due to exacerbation of asthma. She continue to use albuterol inhaler.  No fever or recent illness.  Normal lung exam.   She hasn't been able to afford her Breo inhaler.  Has been off for two weeks. She has been told she hasn't met her deductible and the price is high. Today I reviewed [facility] with her.  Other options also a bit too high for her.   She was referred by her PCP when reviewing chart that she was referred for pharmacy assistance with [facility].   I encouraged patient to call [facility] to check into this program and also check with their pricing.     Course of prednisone prescribed. Discussed side effects/benefits.     Asthma not under ideal control due to not being on her regular inhaler.     At the end of the encounter, I discussed results, diagnosis, medications. Discussed red flags for immediate return to clinic/ER, as well as indications for follow up if no improvement. Patient understood and agreed to plan. Patient was stable for discharge.        Lyly Stover PA-C on 5/10/2022 at 4:07 PM          Subjective:     HPI:    Amaya is a 62 year old female who presents to clinic today for the following health issues:  Chief Complaint   Patient presents with     Chest Pain     Shortness of Breath     HPI    Patient complains of mid upper chest pain when she was rolling over in bed.   Patient states she pushes on the area and feels the pain. When she reached up to cabinet this morning she triggered the pain. Patient has hx of asthma and last night was coughing.   She has been coughing more since she hasn't been able to get her Breo inhaler  filled.      Has had stress test last year and normal. No chest pain with exertion.       History obtained from the patient.    Review of Systems   Constitutional: Negative for fever.   Respiratory: Positive for cough and wheezing.    Cardiovascular: Negative for chest pain and leg swelling.         Patient Active Problem List   Diagnosis     Sleep apnea     Osteoarthrosis     Moderate persistent asthma without complication     Hyperthyroidism     Excessive daytime sleepiness     Chronic eczema        No past medical history on file.    Social History     Tobacco Use     Smoking status: Never Smoker     Smokeless tobacco: Never Used   Substance Use Topics     Alcohol use: Not on file             Objective:     Vitals:    05/10/22 1601   BP: 120/70   BP Location: Right arm   Patient Position: Chair   Cuff Size: Adult Large   Pulse: 86   Resp: 18   Temp: 98.4  F (36.9  C)   TempSrc: Oral   SpO2: 99%   Weight: 76.9 kg (169 lb 8 oz)         Physical Exam   EXAM:   Pleasant, alert, appropriate appearance. NAD.  Head Exam: Normocephalic, atraumatic.  Eye Exam:  non icteric/injection.    Ear Exam: TMs grey without bulging. Normal canals.  Normal pinna.  Nose Exam: Normal external nose.    OroPharynx Exam:  Moist mucous membranes. No erythema, pharynx without exudate or hypertrophy.  Neck/Thyroid Exam:  No LAD.    Chest/Respiratory Exam: CTAB.  Cardiovascular Exam: RRR. No murmur or rubs.      Results:  No results found for any visits on 05/10/22.

## 2022-05-22 ENCOUNTER — HEALTH MAINTENANCE LETTER (OUTPATIENT)
Age: 62
End: 2022-05-22

## 2022-09-18 ENCOUNTER — HEALTH MAINTENANCE LETTER (OUTPATIENT)
Age: 62
End: 2022-09-18

## 2022-11-04 ENCOUNTER — OFFICE VISIT (OUTPATIENT)
Dept: URGENT CARE | Facility: URGENT CARE | Age: 62
End: 2022-11-04
Payer: COMMERCIAL

## 2022-11-04 VITALS
RESPIRATION RATE: 14 BRPM | HEART RATE: 93 BPM | TEMPERATURE: 100.8 F | SYSTOLIC BLOOD PRESSURE: 128 MMHG | WEIGHT: 171 LBS | OXYGEN SATURATION: 98 % | DIASTOLIC BLOOD PRESSURE: 82 MMHG | HEIGHT: 64 IN | BODY MASS INDEX: 29.19 KG/M2

## 2022-11-04 DIAGNOSIS — R05.1 ACUTE COUGH: Primary | ICD-10-CM

## 2022-11-04 DIAGNOSIS — R50.9 FEVER, UNSPECIFIED FEVER CAUSE: ICD-10-CM

## 2022-11-04 DIAGNOSIS — J45.41 MODERATE PERSISTENT ASTHMA WITH ACUTE EXACERBATION: ICD-10-CM

## 2022-11-04 LAB
FLUAV AG SPEC QL IA: NEGATIVE
FLUBV AG SPEC QL IA: NEGATIVE

## 2022-11-04 PROCEDURE — U0003 INFECTIOUS AGENT DETECTION BY NUCLEIC ACID (DNA OR RNA); SEVERE ACUTE RESPIRATORY SYNDROME CORONAVIRUS 2 (SARS-COV-2) (CORONAVIRUS DISEASE [COVID-19]), AMPLIFIED PROBE TECHNIQUE, MAKING USE OF HIGH THROUGHPUT TECHNOLOGIES AS DESCRIBED BY CMS-2020-01-R: HCPCS | Performed by: FAMILY MEDICINE

## 2022-11-04 PROCEDURE — U0005 INFEC AGEN DETEC AMPLI PROBE: HCPCS | Performed by: FAMILY MEDICINE

## 2022-11-04 PROCEDURE — 99214 OFFICE O/P EST MOD 30 MIN: CPT | Mod: CS | Performed by: FAMILY MEDICINE

## 2022-11-04 PROCEDURE — 87804 INFLUENZA ASSAY W/OPTIC: CPT

## 2022-11-04 RX ORDER — PREDNISONE 20 MG/1
40 TABLET ORAL DAILY
Qty: 10 TABLET | Refills: 0 | Status: SHIPPED | OUTPATIENT
Start: 2022-11-04 | End: 2022-11-09

## 2022-11-04 RX ORDER — CODEINE PHOSPHATE AND GUAIFENESIN 10; 100 MG/5ML; MG/5ML
2 SOLUTION ORAL EVERY 6 HOURS PRN
Qty: 118 ML | Refills: 0 | Status: SHIPPED | OUTPATIENT
Start: 2022-11-04

## 2022-11-04 RX ORDER — CODEINE PHOSPHATE AND GUAIFENESIN 10; 100 MG/5ML; MG/5ML
2 SOLUTION ORAL EVERY 6 HOURS PRN
Qty: 118 ML | Refills: 0 | Status: SHIPPED | OUTPATIENT
Start: 2022-11-04 | End: 2022-11-04

## 2022-11-04 RX ORDER — BENZONATATE 200 MG/1
200 CAPSULE ORAL 3 TIMES DAILY PRN
Qty: 30 CAPSULE | Refills: 0 | Status: SHIPPED | OUTPATIENT
Start: 2022-11-04

## 2022-11-04 NOTE — PATIENT INSTRUCTIONS
Take full course of prednisone burst for asthma flare  Continue with inhalers  Okay to take tessalon perles to help with cough  Okay to take robitussin with codeine to help with cough, best at bedtime due to drowsiness  Okay for tylenol and ibuprofen for discomfort and fever as needed    We will contact you if the COVID screen is positive

## 2022-11-04 NOTE — PROGRESS NOTES
SUBJECTIVE:   Amaya Adams is a 62 year old female presenting with a chief complaint of cough, headache, congestion.  Low grade fever.  Started with cough and then developed more chest discomfort from excessive cough, headache, sore throat.  Runny nose started.  Onset of symptoms was 3 day(s) ago.  Course of illness is worsening.    Severity moderate  Current and Associated symptoms: cough, chest pain, headache  Treatment measures tried include Decongestants, Inhaler, Fluids and Rest.  Predisposing factors include HX of asthma.    No close positive COVID exposure  Completed COVID vaccinations, boosted    History reviewed. No pertinent past medical history.  Current Outpatient Medications   Medication Sig Dispense Refill     albuterol (PROAIR HFA/PROVENTIL HFA/VENTOLIN HFA) 108 (90 Base) MCG/ACT inhaler Inhale 2 puffs into the lungs       cetirizine (ZYRTEC) 10 MG tablet Take 10 mg by mouth       cyclobenzaprine (FLEXERIL) 10 MG tablet Take 10 mg by mouth       fluticasone (FLONASE) 50 MCG/ACT nasal spray INHALE TWO SPRAYS INTO EACH NOSTRIL EVERY DAY       fluticasone-vilanterol (BREO ELLIPTA) 100-25 MCG/INH inhaler Inhale 1 puff into the lungs       hydrocortisone 2.5 % cream        meloxicam (MOBIC) 15 MG tablet Take 15 mg by mouth       oxybutynin (DITROPAN) 5 MG tablet Take 1 tablet by mouth At Bedtime       tolterodine (DETROL) 2 MG tablet        diclofenac (VOLTAREN) 75 MG EC tablet Take 1 tablet (75 mg) by mouth 2 times daily for 10 days 20 tablet 0     HYDROcodone-acetaminophen (NORCO) 5-325 MG tablet Take 1 tablet by mouth every 6 hours as needed for pain (Patient not taking: Reported on 11/4/2022) 15 tablet 0     methylPREDNISolone (MEDROL DOSEPAK) 4 MG tablet therapy pack Follow Package Directions (Patient not taking: Reported on 11/4/2022) 21 tablet 0     ondansetron (ZOFRAN ODT) 4 MG ODT tab Take 1 tablet (4 mg) by mouth every 8 hours as needed for nausea (Patient not taking: Reported on 11/4/2022) 10  "tablet 0     Social History     Tobacco Use     Smoking status: Never     Smokeless tobacco: Never   Substance Use Topics     Alcohol use: Yes       ROS:  Review of systems negative except as stated above.    OBJECTIVE:  /82 (BP Location: Right arm, Patient Position: Chair, Cuff Size: Adult Regular)   Pulse 93   Temp (!) 100.8  F (38.2  C) (Oral)   Resp 14   Ht 1.626 m (5' 4\")   Wt 77.6 kg (171 lb)   LMP  (LMP Unknown)   SpO2 98%   BMI 29.35 kg/m    GENERAL APPEARANCE: healthy, alert and no distress  RESP: lungs with decrease breath sound, no crackles or wheezes  CV: regular rates and rhythm, normal S1 S2, no murmur noted  PSYCH: mentation appears normal and affect normal/bright    Results for orders placed or performed in visit on 11/04/22   Influenza A/B antigen     Status: Normal    Specimen: Nose; Swab   Result Value Ref Range    Influenza A antigen Negative Negative    Influenza B antigen Negative Negative    Narrative    Test results must be correlated with clinical data. If necessary, results should be confirmed by a molecular assay or viral culture.       ASSESSMENT/PLAN:  (R05.1) Acute cough  (primary encounter diagnosis)  Plan: Symptomatic; Yes; 11/1/2022 COVID-19 Virus         (Coronavirus) by PCR Nose, benzonatate         (TESSALON) 200 MG capsule, guaiFENesin-codeine         (ROBITUSSIN AC) 100-10 MG/5ML solution,         DISCONTINUED: guaiFENesin-codeine (ROBITUSSIN         AC) 100-10 MG/5ML solution            (R50.9) Fever, unspecified fever cause  Plan: Influenza A/B antigen            (J45.41) Moderate persistent asthma with acute exacerbation  Plan: predniSONE (DELTASONE) 20 MG tablet            Reassurance given, patient is not in acute respiratory distress and discussed symptomatic treatment with tylenol, ibuprofen, plenty of fluids and rest.  RX prednisone burst given for asthma flare that is causing more symptoms, encourage to continue with inhalers.  RX tessalon perles and RX " jackie AC given to help with cough.  COVID screen obtained as symptoms overlap with COVID infection, quarantine while awaiting result    Follow up with primary provider if no improvement of symptoms in 1 week    Ladarius Dougherty MD  November 4, 2022 6:16 PM

## 2022-11-06 ENCOUNTER — TELEPHONE (OUTPATIENT)
Dept: NURSING | Facility: CLINIC | Age: 62
End: 2022-11-06

## 2022-11-06 LAB — SARS-COV-2 RNA RESP QL NAA+PROBE: POSITIVE

## 2022-11-06 NOTE — TELEPHONE ENCOUNTER
Coronavirus (COVID-19) Notification    Caller Name (Patient, parent, daughter/son, grandparent, etc)  patient    Reason for call  Notify of Positive Coronavirus (COVID-19) lab results, assess symptoms,  review [facility] recommendations    Lab Result    Lab test:  2019-nCoV rRt-PCR or SARS-CoV-2 PCR    Oropharyngeal AND/OR nasopharyngeal swabs is POSITIVE for 2019-nCoV RNA/SARS-COV-2 PCR (COVID-19 virus)      Gather patient reported symptoms   Assessment   Current Symptoms at time of phone call, reported by patient: (if no symptoms, document: No symptoms] coughing   Date of symptom(s) onset (if applicable) 11.2.22     If at time of call, Patients symptoms have worsened, the Patient should contact 911 or have someone drive them to Emergency Dept promptly:      If Patient calling 911, inform 911 personal that you have tested positive for the Coronavirus (COVID-19).  Place mask on and await 911 to arrive.    If Emergency Dept, If possible, please have another adult drive you to the Emergency Dept but you need to wear mask when in contact with other people.      Treatment Options:   Patient classified as COVID treatment eligible by Epic high risk algorithm: Yes  Is the patient symptomatic at the time of result notification? Yes. Was the onset of symptoms within the last 5 days? Yes.   There are now oral medications available for the treatment of COVID-19.  Taking one of these medications within the first five days of symptoms (when people may not yet feel severely ill) has been shown to make people feel better, prevent them from getting sicker, and preventing hospitalization and death.   Does the patient agree to have a visit with a provider to discuss treatment options? Yes. Is the patient seen at [facility]?  No: Warm transfer caller to [phone] to be scheduled with a virtual urgent provider.  During transfer, instruct  on appropriate time frame for visit     Review information with  Patient    Your result was positive. This means you have COVID-19 (coronavirus).    How can I protect others?    These guidelines are for isolating before returning to work, school or .    If you DO have symptoms    Stay home and away from others     For at least 5 days after your symptoms started, AND    You are fever free for 24 hours (with no medicine that reduces fever), AND    Your other symptoms are better    Wear a mask for 10 full days anytime you are around others    If you DON'T have symptoms    Stay home and away from others for at least 5 days after your positive test    Wear a mask for 10 full days anytime you are around others    There may be different guidelines for healthcare facilities.  Please check with the specific sites before arriving.    If you have been told by a doctor that you were severely ill with COVID-19 or are immunocompromised, you should isolate for at least 10 days.    You should not go back to work until you meet the guidelines above for ending your home isolation. You don't need to be retested for COVID-19 before going back to work--studies show that you won't spread the virus if it's been at least 10 days since your symptoms started (or 20 days, if you have a weak immune system).    Employers, schools, and daycares: This is an official notice for this person's medical guidelines for returning in-person.  They must meet the above guidelines before going back to work, school or  in person.    You will receive a positive COVID-19 letter via [employer] or the mail soon with additional self-care information.    Would you like me to review some of that information with you now?  Yes    How can I take care of myself?      Get lots of rest. Drink extra fluids (unless a doctor has told you not to).      Take Tylenol (acetaminophen) for fever or pain. If you have liver or kidney problems, ask your family doctor if it's okay to take Tylenol.     Take either:     650 mg (two 325  mg pills) every 4 to 6 hours, or     1,000 mg (two 500 mg pills) every 8 hours as needed.     Note: Do not take more than 3,000 mg in one day. Acetaminophen is found in many medicines (both prescribed and over-the-counter medicines). Read all labels to be sure you don't take too much.    For children, check the Tylenol bottle for the right dose (based on their age or weight).      If you have other health problems (like cancer, heart failure, an organ transplant or severe kidney disease): Call your specialty clinic if you don't feel better in the next 2 days.      Know when to call 911: Emergency warning signs include:    Trouble breathing or shortness of breath    Pain or pressure in the chest that doesn't go away    Feeling confused like you haven't felt before, or not being able to wake up    Bluish-colored lips or face        If you were tested for an upcoming procedure, please contact your provider for next steps.    Katherine Santizo

## 2023-05-24 ENCOUNTER — HOSPITAL ENCOUNTER (EMERGENCY)
Facility: CLINIC | Age: 63
Discharge: HOME | End: 2023-05-24
Attending: EMERGENCY MEDICINE | Admitting: EMERGENCY MEDICINE
Payer: COMMERCIAL

## 2023-05-24 ENCOUNTER — APPOINTMENT (OUTPATIENT)
Dept: CT IMAGING | Facility: CLINIC | Age: 63
End: 2023-05-24
Attending: EMERGENCY MEDICINE
Payer: COMMERCIAL

## 2023-05-24 VITALS
DIASTOLIC BLOOD PRESSURE: 86 MMHG | TEMPERATURE: 98.3 F | SYSTOLIC BLOOD PRESSURE: 123 MMHG | HEART RATE: 73 BPM | OXYGEN SATURATION: 98 % | HEIGHT: 64 IN | WEIGHT: 168 LBS | RESPIRATION RATE: 18 BRPM | BODY MASS INDEX: 28.68 KG/M2

## 2023-05-24 DIAGNOSIS — R51.9 NONINTRACTABLE HEADACHE, UNSPECIFIED CHRONICITY PATTERN, UNSPECIFIED HEADACHE TYPE: ICD-10-CM

## 2023-05-24 DIAGNOSIS — D44.7 PARAGANGLIOMA (H): ICD-10-CM

## 2023-05-24 LAB
ABO/RH(D): NORMAL
ALBUMIN SERPL BCG-MCNC: 4.5 G/DL (ref 3.5–5.2)
ALP SERPL-CCNC: 125 U/L (ref 35–104)
ALT SERPL W P-5'-P-CCNC: 12 U/L (ref 10–35)
ANION GAP SERPL CALCULATED.3IONS-SCNC: 12 MMOL/L (ref 7–15)
ANTIBODY SCREEN: NEGATIVE
APTT PPP: 32 SECONDS (ref 22–38)
AST SERPL W P-5'-P-CCNC: 14 U/L (ref 10–35)
ATRIAL RATE - MUSE: 75 BPM
BASOPHILS # BLD AUTO: 0.1 10E3/UL (ref 0–0.2)
BASOPHILS NFR BLD AUTO: 1 %
BILIRUB SERPL-MCNC: 0.3 MG/DL
BUN SERPL-MCNC: 9.1 MG/DL (ref 8–23)
CALCIUM SERPL-MCNC: 9.6 MG/DL (ref 8.8–10.2)
CHLORIDE SERPL-SCNC: 101 MMOL/L (ref 98–107)
CREAT SERPL-MCNC: 0.72 MG/DL (ref 0.51–0.95)
DEPRECATED HCO3 PLAS-SCNC: 25 MMOL/L (ref 22–29)
DIASTOLIC BLOOD PRESSURE - MUSE: NORMAL MMHG
EOSINOPHIL # BLD AUTO: 0.6 10E3/UL (ref 0–0.7)
EOSINOPHIL NFR BLD AUTO: 7 %
ERYTHROCYTE [DISTWIDTH] IN BLOOD BY AUTOMATED COUNT: 14.8 % (ref 10–15)
ERYTHROCYTE [SEDIMENTATION RATE] IN BLOOD BY WESTERGREN METHOD: 43 MM/HR (ref 0–30)
GFR SERPL CREATININE-BSD FRML MDRD: >90 ML/MIN/1.73M2
GLUCOSE SERPL-MCNC: 94 MG/DL (ref 70–99)
HCT VFR BLD AUTO: 40.4 % (ref 35–47)
HGB BLD-MCNC: 13 G/DL (ref 11.7–15.7)
IMM GRANULOCYTES # BLD: 0 10E3/UL
IMM GRANULOCYTES NFR BLD: 0 %
INR PPP: 1.06 (ref 0.85–1.15)
INTERPRETATION ECG - MUSE: NORMAL
LYMPHOCYTES # BLD AUTO: 2.9 10E3/UL (ref 0.8–5.3)
LYMPHOCYTES NFR BLD AUTO: 38 %
MAGNESIUM SERPL-MCNC: 1.9 MG/DL (ref 1.7–2.3)
MCH RBC QN AUTO: 26.5 PG (ref 26.5–33)
MCHC RBC AUTO-ENTMCNC: 32.2 G/DL (ref 31.5–36.5)
MCV RBC AUTO: 82 FL (ref 78–100)
MONOCYTES # BLD AUTO: 0.8 10E3/UL (ref 0–1.3)
MONOCYTES NFR BLD AUTO: 10 %
NEUTROPHILS # BLD AUTO: 3.3 10E3/UL (ref 1.6–8.3)
NEUTROPHILS NFR BLD AUTO: 44 %
NRBC # BLD AUTO: 0 10E3/UL
NRBC BLD AUTO-RTO: 0 /100
P AXIS - MUSE: 32 DEGREES
PLATELET # BLD AUTO: 394 10E3/UL (ref 150–450)
POTASSIUM SERPL-SCNC: 3.8 MMOL/L (ref 3.4–5.3)
PR INTERVAL - MUSE: 150 MS
PROT SERPL-MCNC: 8.1 G/DL (ref 6.4–8.3)
QRS DURATION - MUSE: 80 MS
QT - MUSE: 410 MS
QTC - MUSE: 457 MS
R AXIS - MUSE: 26 DEGREES
RBC # BLD AUTO: 4.91 10E6/UL (ref 3.8–5.2)
SODIUM SERPL-SCNC: 138 MMOL/L (ref 136–145)
SPECIMEN EXPIRATION DATE: NORMAL
SYSTOLIC BLOOD PRESSURE - MUSE: NORMAL MMHG
T AXIS - MUSE: 56 DEGREES
TROPONIN T SERPL HS-MCNC: 7 NG/L
VENTRICULAR RATE- MUSE: 75 BPM
WBC # BLD AUTO: 7.6 10E3/UL (ref 4–11)

## 2023-05-24 PROCEDURE — 96375 TX/PRO/DX INJ NEW DRUG ADDON: CPT | Mod: 59

## 2023-05-24 PROCEDURE — 250N000011 HC RX IP 250 OP 636: Performed by: EMERGENCY MEDICINE

## 2023-05-24 PROCEDURE — 80053 COMPREHEN METABOLIC PANEL: CPT | Performed by: EMERGENCY MEDICINE

## 2023-05-24 PROCEDURE — 96374 THER/PROPH/DIAG INJ IV PUSH: CPT | Mod: 59

## 2023-05-24 PROCEDURE — 93005 ELECTROCARDIOGRAM TRACING: CPT

## 2023-05-24 PROCEDURE — 83735 ASSAY OF MAGNESIUM: CPT | Performed by: EMERGENCY MEDICINE

## 2023-05-24 PROCEDURE — 36415 COLL VENOUS BLD VENIPUNCTURE: CPT | Performed by: EMERGENCY MEDICINE

## 2023-05-24 PROCEDURE — 250N000009 HC RX 250: Performed by: EMERGENCY MEDICINE

## 2023-05-24 PROCEDURE — 85025 COMPLETE CBC W/AUTO DIFF WBC: CPT | Performed by: EMERGENCY MEDICINE

## 2023-05-24 PROCEDURE — 86850 RBC ANTIBODY SCREEN: CPT | Performed by: EMERGENCY MEDICINE

## 2023-05-24 PROCEDURE — 70450 CT HEAD/BRAIN W/O DYE: CPT

## 2023-05-24 PROCEDURE — 85652 RBC SED RATE AUTOMATED: CPT | Performed by: EMERGENCY MEDICINE

## 2023-05-24 PROCEDURE — 85730 THROMBOPLASTIN TIME PARTIAL: CPT | Performed by: EMERGENCY MEDICINE

## 2023-05-24 PROCEDURE — 85610 PROTHROMBIN TIME: CPT | Performed by: EMERGENCY MEDICINE

## 2023-05-24 PROCEDURE — 84484 ASSAY OF TROPONIN QUANT: CPT | Performed by: EMERGENCY MEDICINE

## 2023-05-24 PROCEDURE — 70498 CT ANGIOGRAPHY NECK: CPT

## 2023-05-24 PROCEDURE — 99285 EMERGENCY DEPT VISIT HI MDM: CPT | Mod: 25

## 2023-05-24 PROCEDURE — 70496 CT ANGIOGRAPHY HEAD: CPT

## 2023-05-24 RX ORDER — MORPHINE SULFATE 4 MG/ML
4 INJECTION, SOLUTION INTRAMUSCULAR; INTRAVENOUS
Status: COMPLETED | OUTPATIENT
Start: 2023-05-24 | End: 2023-05-24

## 2023-05-24 RX ORDER — IOPAMIDOL 755 MG/ML
500 INJECTION, SOLUTION INTRAVASCULAR ONCE
Status: COMPLETED | OUTPATIENT
Start: 2023-05-24 | End: 2023-05-24

## 2023-05-24 RX ORDER — ONDANSETRON 2 MG/ML
4 INJECTION INTRAMUSCULAR; INTRAVENOUS ONCE
Status: COMPLETED | OUTPATIENT
Start: 2023-05-24 | End: 2023-05-24

## 2023-05-24 RX ADMIN — IOPAMIDOL 54 ML: 755 INJECTION, SOLUTION INTRAVENOUS at 16:31

## 2023-05-24 RX ADMIN — MORPHINE SULFATE 4 MG: 4 INJECTION, SOLUTION INTRAMUSCULAR; INTRAVENOUS at 18:44

## 2023-05-24 RX ADMIN — SODIUM CHLORIDE 54 ML: 9 INJECTION, SOLUTION INTRAVENOUS at 16:31

## 2023-05-24 RX ADMIN — ONDANSETRON 4 MG: 2 INJECTION INTRAMUSCULAR; INTRAVENOUS at 18:44

## 2023-05-24 ASSESSMENT — ACTIVITIES OF DAILY LIVING (ADL)
ADLS_ACUITY_SCORE: 35
ADLS_ACUITY_SCORE: 35

## 2023-05-24 NOTE — PROGRESS NOTES
Contacted regarding findings on CTA    NECK CTA:  1.  Normal neck CTA.  2.  Approximately 2.3 cm in diameter heterogeneously enhancing lesion in the region of left jugular foramen. Associated permeative-destructive change of adjacent bone including erosion of the jugular spine and dehiscence of posterior wall of the ICA   canal.   3.  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     RECOMMENDATIONS:  [facility] office will contact patient for clinic appt with Dr. Cooper.    Discussed with Dr. Cooper.    [facility]  [facility]  [address] [address]    Tel [phone]  Pager [phone]

## 2023-05-24 NOTE — ED TRIAGE NOTES
Pt arrives ambulatory with daughter for right sided headache that started last night but acutely worsened today. Reports no vision changes or numbness or tingling and is able to communicate appropriately during triage. Diagnosed with hypertension two weeks ago and has been taking BP meds. Stroke eval performed by provider in triage.

## 2023-05-24 NOTE — ED PROVIDER NOTES
"PIT/Triage Evaluation    Patient presented with \"the worst headache of her life\", which onset at 0200 and is located above her right eye. She notes having a slight headache last night, which dissipated on its own before it re-onset abruptly this afternoon. Her headache is a 10/10 for pain. She denies any numbness, tingling, weakness, vision changes.     Exam is notable for:    Patient Vitals for the past 24 hrs:   BP Temp Temp src Pulse Resp SpO2 Height Weight   05/24/23 1608 (!) 157/95 98.3  F (36.8  C) Temporal 78 18 100 % 1.626 m (5' 4\") 76.2 kg (168 lb)       General: Sitting on the ED chair, no distress  HEENT: Normocephalic, atraumatic  Cardiac: Warm and well perfused  Pulm: Breathing comfortably, no accessory muscle usage, no conversational dyspnea  GI: Abdomen soft, nontender, no rigidity or guarding  MSK: No deformities  Skin: Warm and dry  Neuro: Moves all extremities  Psych: Normal mood and affect    [employer] Stroke Scale  Exam Interval: Baseline   Score    Level of consciousness: (0)   Alert, keenly responsive    LOC questions: (0)   Answers both questions correctly    LOC commands: (0)   Performs both tasks correctly    Best gaze: (0)   Normal    Visual: (0)   No visual loss    Facial palsy: (0)   Normal symmetrical movements    Motor arm (left): (0)   No drift    Motor arm (right): (0)   No drift    Motor leg (left): (0)   No drift    Motor leg (right): (0)   No drift    Limb ataxia: (0)   Absent    Sensory: (0)   Normal- no sensory loss    Best language: (0)   Normal- no aphasia    Dysarthria: (0)   Normal    Extinction and inattention: (0)   No abnormality        Total Score:  0        Appropriate interventions for symptom management were initiated if applicable.  Appropriate diagnostic tests were initiated if indicated.    Important information for subsequent clinician:  Worst headache of life starting at 1400, also had transient headache last night.  Neuro intact.    I briefly " evaluated the patient and developed an initial plan of care. I discussed this plan and explained that this brief interaction does not constitute a full evaluation. Patient/family understands that they should wait to be fully evaluated and discuss any test results with another clinician prior to leaving the hospital.     Bret Arshad MD  05/24/23 8551

## 2023-05-24 NOTE — ED PROVIDER NOTES
"  History     Chief Complaint:  Headache       HPI   Amaya Adams is a 63 year old female who presents with headache onset last night. She explains that her headache then began to resolve after she took medication (unable to remember the name), but then worsened approximately four hours ago. Her headache is described as if she is being \"hit on the side of her head\" and localized to the right side of her right eye. Her pain is reported as being a 10/10 severity. She does note a \"pounding\" sensation on the left side of her head as if she can hear the blood pumping. She denies any extremity symptoms including numbness, tingling, or weakness. No visual changes or changes in her hearing. No speech difficulties. She also denies any trouble swallowing, chest pain, or abdominal pain. No urinary symptoms including dysuria, frequency, or urgency.     Independent Historian: Family member at bedside confirms the above history.    Review of External Notes: None    Medications:   Albuterol  Tessalon  Zyrtec  Flexeril  Voltaren  Norco  Mobic  Medrol Dosepak  Zofran ODT  Ditropan  Detrol  Prinivil/Zestril    Past Medical History:    Sleep apnea  Hyperthyroidism  Moderate persistent asthma without complication  Osteoarthritis  Chronic eczema  Chronic pain of both shoulders  Ureterolithiasis  Nephrolithiasis    Past Surgical History:    Abdominal hernia repair  Total abdominal hysterectomy  Fibroidectomy  Breast biopsy     Physical Exam     Patient Vitals for the past 24 hrs:   BP Temp Temp src Pulse Resp SpO2 Height Weight   05/24/23 2115 123/86 -- -- 73 18 98 % -- --   05/24/23 2100 (!) 115/90 -- -- 71 -- 98 % -- --   05/24/23 2045 (!) 147/88 -- -- 72 -- 100 % -- --   05/24/23 2030 133/85 -- -- 71 -- 97 % -- --   05/24/23 2015 (!) 147/88 -- -- 70 -- 99 % -- --   05/24/23 2000 (!) 147/79 -- -- 70 -- 100 % -- --   05/24/23 1930 (!) 145/87 -- -- 76 -- 100 % -- --   05/24/23 1915 (!) 157/89 -- -- 78 -- 100 % -- --   05/24/23 9518 (!) " "146/93 -- -- 80 -- 100 % -- --   05/24/23 1608 (!) 157/95 98.3  F (36.8  C) Temporal 78 18 100 % 1.626 m (5' 4\") 76.2 kg (168 lb)     Physical Exam  Constitutional: Patient is well appearing. No distress.  Head: Atraumatic. Ears normal.  No masses.  No pure temporal tenderness.  Mouth/Throat: Oropharynx is clear and moist. No oropharyngeal exudate.  Eyes: Conjunctivae and EOM are normal. No scleral icterus.  Neck: Normal range of motion. Neck supple. No meningismus.  Cardiovascular: Normal rate, regular rhythm, normal heart sounds and intact distal perfusion.   Pulmonary/Chest: Breath sounds normal. No respiratory distress.  Abdominal: Soft. Bowel sounds are normal. No distension. No tenderness. No rebound or guarding.   Musculoskeletal: Normal range of motion. No edema or tenderness.   Neurological: Alert and orientated to person, place, and time. No observable focal neuro deficit. All cranial nerves intact.  Skin: Warm and dry. No rash noted. Not diaphoretic.      Emergency Department Course   ECG  ECG taken at 1916, ECG read at 1920  Normal sinus rhythm  Normal ECG   Rate 75 bpm. MT interval 150 ms. QRS duration 80 ms. QT/QTc 410/457 ms. P-R-T axes 32 26 56.     Imaging:  CTA Head Neck with Contrast   Final Result   IMPRESSION:    HEAD CTA:   Normal CTA [place].      NECK CTA:   1.  Normal neck CTA.   2.  Approximately 2.3 cm in diameter heterogeneously enhancing lesion in the region of left jugular foramen. Associated permeative-destructive change of adjacent bone including erosion of the jugular spine and dehiscence of posterior wall of the ICA    can[place].    3.  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         CT Head w/o Contrast   Final Result   IMPRESSION:   1.  No acute intracranial process.            Report per radiology    Laboratory:  Labs Ordered and Resulted from Time of ED Arrival to Time of ED Departure   COMPREHENSIVE METABOLIC PANEL - " Abnormal       Result Value    Sodium 138      Potassium 3.8      Chloride 101      Carbon Dioxide (CO2) 25      Anion Gap 12      Urea Nitrogen 9.1      Creatinine 0.72      Calcium 9.6      Glucose 94      Alkaline Phosphatase 125 (*)     AST 14      ALT 12      Protein Total 8.1      Albumin 4.5      Bilirubin Total 0.3      GFR Estimate >90     ERYTHROCYTE SEDIMENTATION RATE AUTO - Abnormal    Erythrocyte Sedimentation Rate 43 (*)    TROPONIN T, HIGH SENSITIVITY - Normal    Troponin T, High Sensitivity 7     MAGNESIUM - Normal    Magnesium 1.9     INR - Normal    INR 1.06     PARTIAL THROMBOPLASTIN TIME - Normal    aPTT 32     CBC WITH PLATELETS AND DIFFERENTIAL    WBC Count 7.6      RBC Count 4.91      Hemoglobin 13.0      Hematocrit 40.4      MCV 82      MCH 26.5      MCHC 32.2      RDW 14.8      Platelet Count 394      % Neutrophils 44      % Lymphocytes 38      % Monocytes 10      % Eosinophils 7      % Basophils 1      % Immature Granulocytes 0      NRBCs per 100 WBC 0      Absolute Neutrophils 3.3      Absolute Lymphocytes 2.9      Absolute Monocytes 0.8      Absolute Eosinophils 0.6      Absolute Basophils 0.1      Absolute Immature Granulocytes 0.0      Absolute NRBCs 0.0     TYPE AND SCREEN, ADULT    ABO/RH(D) O POS      Antibody Screen Negative      SPECIMEN EXPIRATION DATE [id number]     ABO/RH TYPE AND SCREEN     Procedures   None.    Emergency Department Course & Assessments:  Interventions:  Medications   iopamidol (ISOVUE-370) solution 500 mL (54 mLs Intravenous $Given 5/24/23 1631)   CT scan flush (54 mLs Intravenous $Given 5/24/23 1631)   morphine (PF) injection 4 mg (4 mg Intravenous $Given 5/24/23 1844)   ondansetron (ZOFRAN) injection 4 mg (4 mg Intravenous $Given 5/24/23 1844)     Assessments:  1814 I obtained history and examined the patient as noted above.   2125 I rechecked the patient and explained findings. I believe that they are safe for discharge at this time.     Independent  Interpretation (X-rays, CTs, rhythm strip):  I reviewed today's CTA Head/Neck and agree with Radiology's interpretation.    Consultations/Discussion of Management or Tests:  1850 I consulted with Aisha Howell, Neurosurgery PA, regarding the patient's history and presentation here in the emergency department.  1928 I spoke with Dr. Limon, ENT, regarding the patient's history and presentation as well as plan for coordinated treatment with Neurosurgery.    Social Determinants of Health affecting care:   None    Disposition:  The patient was discharged to home.     Impression & Plan    CMS Diagnoses: None    Medical Decision Making:  Amaya Adams is a 63 year old female who presents with a right sided headache. I considered a broad differential diagnosis for this patient including tension, migraine, analgesic rebound, occipital neuralgia, etc.  I also considered other less common but serious causes considered included meningitis, encephalitis, subarachnoid bleed, temporal arteritis, stroke, tumor, etc.  She has no signs of serious headache etiologies at this point.  CT head normal for brain tissues, no temporal tenderness ESR<50 and no vision changes or eye pain or abnl findings on exam.  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 I consulted with Neurosurgery and ENT to begin coordinating ongoing care for her paraganglioma. Above interventions in the ED have resulted in her feeling much improved and I believe she is safe for discharge. Patient is in agreement with this plan.    All questions and concerns were answered. The patient was discharged home and recommended to follow up with her primary physician and return with any new or worsening symptoms.     Critical Care time:  was 0 minutes for this patient excluding procedures.    Diagnosis:    ICD-10-CM    1. 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  Nonintractable headache, unspecified chronicity pattern, unspecified headache type  R51.9         Scribe Disclosure:  I, Lit Quiroz, am serving as a scribe at 6:23 PM on 5/24/2023 to document services personally performed by Miguel Sullivan MD based on my observations and the provider's statements to me.   5/24/2023   Miguel Sullivan MD Stevens, Andrew C, MD  05/25/23 0102

## 2023-05-31 ENCOUNTER — TELEPHONE (OUTPATIENT)
Dept: NEUROSURGERY | Facility: CLINIC | Age: 63
End: 2023-05-31
Payer: COMMERCIAL

## 2023-05-31 NOTE — TELEPHONE ENCOUNTER
Spoke with patient regarding a need for a new referral, as her insurance won't cover a visit with Dr. Cooper as she has [employer].

## 2023-08-28 ENCOUNTER — TRANSCRIBE ORDERS (OUTPATIENT)
Dept: OTHER | Age: 63
End: 2023-08-28

## 2023-08-28 DIAGNOSIS — D44.7 PARAGANGLIOMA (H): Primary | ICD-10-CM

## 2023-08-31 DIAGNOSIS — D44.7 PARAGANGLIOMA (H): Primary | ICD-10-CM

## 2023-10-04 ENCOUNTER — TRANSFERRED RECORDS (OUTPATIENT)
Dept: HEALTH INFORMATION MANAGEMENT | Facility: CLINIC | Age: 63
End: 2023-10-04

## 2024-01-03 ENCOUNTER — TRANSFERRED RECORDS (OUTPATIENT)
Dept: HEALTH INFORMATION MANAGEMENT | Facility: CLINIC | Age: 64
End: 2024-01-03

## 2024-02-09 ENCOUNTER — TRANSCRIBE ORDERS (OUTPATIENT)
Dept: OTHER | Age: 64
End: 2024-02-09

## 2024-02-09 DIAGNOSIS — D44.7 GLOMUS JUGULARE TUMOR (H): Primary | ICD-10-CM

## 2024-02-09 DIAGNOSIS — R13.10 DYSPHAGIA: ICD-10-CM

## 2024-09-22 ENCOUNTER — HOSPITAL ENCOUNTER (EMERGENCY)
Facility: CLINIC | Age: 64
Discharge: HOME | End: 2024-09-22
Attending: STUDENT IN AN ORGANIZED HEALTH CARE EDUCATION/TRAINING PROGRAM | Admitting: STUDENT IN AN ORGANIZED HEALTH CARE EDUCATION/TRAINING PROGRAM
Payer: COMMERCIAL

## 2024-09-22 VITALS
TEMPERATURE: 98.2 F | SYSTOLIC BLOOD PRESSURE: 155 MMHG | OXYGEN SATURATION: 100 % | WEIGHT: 154.54 LBS | HEART RATE: 78 BPM | DIASTOLIC BLOOD PRESSURE: 101 MMHG | BODY MASS INDEX: 26.53 KG/M2 | RESPIRATION RATE: 22 BRPM

## 2024-09-22 DIAGNOSIS — K21.9 GASTROESOPHAGEAL REFLUX DISEASE, UNSPECIFIED WHETHER ESOPHAGITIS PRESENT: ICD-10-CM

## 2024-09-22 DIAGNOSIS — K94.20 COMPLICATION OF FEEDING TUBE (H): Primary | ICD-10-CM

## 2024-09-22 PROCEDURE — 99282 EMERGENCY DEPT VISIT SF MDM: CPT

## 2024-09-22 RX ORDER — LIDOCAINE 40 MG/G
CREAM TOPICAL
Qty: 30 G | Refills: 0 | Status: SHIPPED | OUTPATIENT
Start: 2024-09-22 | End: 2024-09-29

## 2024-09-22 ASSESSMENT — COLUMBIA-SUICIDE SEVERITY RATING SCALE - C-SSRS
6. HAVE YOU EVER DONE ANYTHING, STARTED TO DO ANYTHING, OR PREPARED TO DO ANYTHING TO END YOUR LIFE?: NO
2. HAVE YOU ACTUALLY HAD ANY THOUGHTS OF KILLING YOURSELF IN THE PAST MONTH?: NO
1. IN THE PAST MONTH, HAVE YOU WISHED YOU WERE DEAD OR WISHED YOU COULD GO TO SLEEP AND NOT WAKE UP?: NO

## 2024-09-22 NOTE — ED TRIAGE NOTES
Pt states having pain at site of feeing tube. Pt states she had it placed in Dec. States noticing some increasing redness at site. States it has been working appropriately at home but has just been causing her more pain. Pt also states she feels like she has been vomiting more and that she she has been having more congestion in her throat.

## 2024-09-22 NOTE — DISCHARGE INSTRUCTIONS
Thank you for allowing us to evaluate you today.  Follow up with primary care clinician  in 1 week for reevaluation..  Use lidocaine cream around your G-tube site to see if this helps your pain.  Take the omeprazole as prescribed for your reflux-like symptoms.  Please read the guidance provided with your discharge instructions.  Immediately return to the emergency department with any concerns.

## 2024-10-04 ENCOUNTER — TRANSFERRED RECORDS (OUTPATIENT)
Dept: HEALTH INFORMATION MANAGEMENT | Facility: CLINIC | Age: 64
End: 2024-10-04
Payer: COMMERCIAL

## 2024-10-08 ENCOUNTER — MEDICAL CORRESPONDENCE (OUTPATIENT)
Dept: HEALTH INFORMATION MANAGEMENT | Facility: CLINIC | Age: 64
End: 2024-10-08
Payer: COMMERCIAL

## 2024-10-21 ENCOUNTER — MEDICAL CORRESPONDENCE (OUTPATIENT)
Dept: HEALTH INFORMATION MANAGEMENT | Facility: CLINIC | Age: 64
End: 2024-10-21
Payer: COMMERCIAL

## 2024-12-01 ENCOUNTER — HEALTH MAINTENANCE LETTER (OUTPATIENT)
Age: 64
End: 2024-12-01